# Patient Record
Sex: MALE | Race: WHITE | NOT HISPANIC OR LATINO | URBAN - METROPOLITAN AREA
[De-identification: names, ages, dates, MRNs, and addresses within clinical notes are randomized per-mention and may not be internally consistent; named-entity substitution may affect disease eponyms.]

---
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
## 2025-05-06 ENCOUNTER — NON-APPOINTMENT (OUTPATIENT)
Age: 20
End: 2025-05-06

## 2025-05-06 ENCOUNTER — INPATIENT (INPATIENT)
Facility: HOSPITAL | Age: 20
LOS: 2 days | Discharge: HOME | DRG: 201 | End: 2025-05-09
Attending: SURGERY | Admitting: SURGERY
Payer: COMMERCIAL

## 2025-05-06 VITALS
TEMPERATURE: 99 F | DIASTOLIC BLOOD PRESSURE: 82 MMHG | OXYGEN SATURATION: 99 % | WEIGHT: 121.03 LBS | HEART RATE: 99 BPM | RESPIRATION RATE: 19 BRPM | SYSTOLIC BLOOD PRESSURE: 133 MMHG

## 2025-05-06 DIAGNOSIS — J93.83 OTHER PNEUMOTHORAX: ICD-10-CM

## 2025-05-06 LAB
ALBUMIN SERPL ELPH-MCNC: 4.3 G/DL — SIGNIFICANT CHANGE UP (ref 3.3–5)
ALP SERPL-CCNC: 63 U/L — SIGNIFICANT CHANGE UP (ref 40–120)
ALT FLD-CCNC: 27 U/L — SIGNIFICANT CHANGE UP (ref 12–78)
ANION GAP SERPL CALC-SCNC: 4 MMOL/L — LOW (ref 5–17)
APTT BLD: 30.3 SEC — SIGNIFICANT CHANGE UP (ref 26.1–36.8)
AST SERPL-CCNC: 5 U/L — LOW (ref 15–37)
BASE EXCESS BLDV CALC-SCNC: 0.6 MMOL/L — SIGNIFICANT CHANGE UP (ref -2–3)
BASOPHILS # BLD AUTO: 0.06 K/UL — SIGNIFICANT CHANGE UP (ref 0–0.2)
BASOPHILS NFR BLD AUTO: 0.7 % — SIGNIFICANT CHANGE UP (ref 0–2)
BILIRUB SERPL-MCNC: 0.4 MG/DL — SIGNIFICANT CHANGE UP (ref 0.2–1.2)
BLD GP AB SCN SERPL QL: SIGNIFICANT CHANGE UP
BUN SERPL-MCNC: 15 MG/DL — SIGNIFICANT CHANGE UP (ref 7–23)
CALCIUM SERPL-MCNC: 8.8 MG/DL — SIGNIFICANT CHANGE UP (ref 8.5–10.1)
CHLORIDE SERPL-SCNC: 109 MMOL/L — HIGH (ref 96–108)
CO2 SERPL-SCNC: 27 MMOL/L — SIGNIFICANT CHANGE UP (ref 22–31)
CREAT SERPL-MCNC: 1.13 MG/DL — SIGNIFICANT CHANGE UP (ref 0.5–1.3)
EGFR: 95 ML/MIN/1.73M2 — SIGNIFICANT CHANGE UP
EGFR: 95 ML/MIN/1.73M2 — SIGNIFICANT CHANGE UP
EOSINOPHIL # BLD AUTO: 0.18 K/UL — SIGNIFICANT CHANGE UP (ref 0–0.5)
EOSINOPHIL NFR BLD AUTO: 2 % — SIGNIFICANT CHANGE UP (ref 0–6)
GAS PNL BLDV: SIGNIFICANT CHANGE UP
GLUCOSE SERPL-MCNC: 116 MG/DL — HIGH (ref 70–99)
HCO3 BLDV-SCNC: 27 MMOL/L — SIGNIFICANT CHANGE UP (ref 22–29)
HCT VFR BLD CALC: 48.6 % — SIGNIFICANT CHANGE UP (ref 39–50)
HGB BLD-MCNC: 16.4 G/DL — SIGNIFICANT CHANGE UP (ref 13–17)
IMM GRANULOCYTES # BLD AUTO: 0.03 K/UL — SIGNIFICANT CHANGE UP (ref 0–0.07)
IMM GRANULOCYTES NFR BLD AUTO: 0.3 % — SIGNIFICANT CHANGE UP (ref 0–0.9)
INR BLD: 1.14 RATIO — SIGNIFICANT CHANGE UP (ref 0.85–1.16)
LYMPHOCYTES # BLD AUTO: 2.61 K/UL — SIGNIFICANT CHANGE UP (ref 1–3.3)
LYMPHOCYTES NFR BLD AUTO: 28.7 % — SIGNIFICANT CHANGE UP (ref 13–44)
MCHC RBC-ENTMCNC: 28.6 PG — SIGNIFICANT CHANGE UP (ref 27–34)
MCHC RBC-ENTMCNC: 33.7 G/DL — SIGNIFICANT CHANGE UP (ref 32–36)
MCV RBC AUTO: 84.8 FL — SIGNIFICANT CHANGE UP (ref 80–100)
MONOCYTES # BLD AUTO: 0.77 K/UL — SIGNIFICANT CHANGE UP (ref 0–0.9)
MONOCYTES NFR BLD AUTO: 8.5 % — SIGNIFICANT CHANGE UP (ref 2–14)
NEUTROPHILS # BLD AUTO: 5.44 K/UL — SIGNIFICANT CHANGE UP (ref 1.8–7.4)
NEUTROPHILS NFR BLD AUTO: 59.8 % — SIGNIFICANT CHANGE UP (ref 43–77)
NRBC # BLD AUTO: 0 K/UL — SIGNIFICANT CHANGE UP (ref 0–0)
NRBC # FLD: 0 K/UL — SIGNIFICANT CHANGE UP (ref 0–0)
NRBC BLD AUTO-RTO: 0 /100 WBCS — SIGNIFICANT CHANGE UP (ref 0–0)
PCO2 BLDV: 52 MMHG — SIGNIFICANT CHANGE UP (ref 42–55)
PH BLDV: 7.33 — SIGNIFICANT CHANGE UP (ref 7.32–7.43)
PLATELET # BLD AUTO: 223 K/UL — SIGNIFICANT CHANGE UP (ref 150–400)
PMV BLD: 10 FL — SIGNIFICANT CHANGE UP (ref 7–13)
PO2 BLDV: 62 MMHG — HIGH (ref 25–45)
POTASSIUM SERPL-MCNC: 3.8 MMOL/L — SIGNIFICANT CHANGE UP (ref 3.5–5.3)
POTASSIUM SERPL-SCNC: 3.8 MMOL/L — SIGNIFICANT CHANGE UP (ref 3.5–5.3)
PROT SERPL-MCNC: 7.1 GM/DL — SIGNIFICANT CHANGE UP (ref 6–8.3)
PROTHROM AB SERPL-ACNC: 13.4 SEC — SIGNIFICANT CHANGE UP (ref 9.9–13.4)
RBC # BLD: 5.73 M/UL — SIGNIFICANT CHANGE UP (ref 4.2–5.8)
RBC # FLD: 12.2 % — SIGNIFICANT CHANGE UP (ref 10.3–14.5)
SAO2 % BLDV: 91 % — HIGH (ref 67–88)
SODIUM SERPL-SCNC: 140 MMOL/L — SIGNIFICANT CHANGE UP (ref 135–145)
TROPONIN I, HIGH SENSITIVITY RESULT: 3.93 NG/L — SIGNIFICANT CHANGE UP
WBC # BLD: 9.09 K/UL — SIGNIFICANT CHANGE UP (ref 3.8–10.5)
WBC # FLD AUTO: 9.09 K/UL — SIGNIFICANT CHANGE UP (ref 3.8–10.5)

## 2025-05-06 PROCEDURE — 80048 BASIC METABOLIC PNL TOTAL CA: CPT

## 2025-05-06 PROCEDURE — 71250 CT THORAX DX C-: CPT | Mod: 26

## 2025-05-06 PROCEDURE — 36415 COLL VENOUS BLD VENIPUNCTURE: CPT

## 2025-05-06 PROCEDURE — 99235 HOSP IP/OBS SAME DATE MOD 70: CPT

## 2025-05-06 PROCEDURE — 71250 CT THORAX DX C-: CPT | Mod: MC

## 2025-05-06 PROCEDURE — 85027 COMPLETE CBC AUTOMATED: CPT

## 2025-05-06 PROCEDURE — 71045 X-RAY EXAM CHEST 1 VIEW: CPT | Mod: 26

## 2025-05-06 PROCEDURE — 93010 ELECTROCARDIOGRAM REPORT: CPT

## 2025-05-06 PROCEDURE — 99291 CRITICAL CARE FIRST HOUR: CPT

## 2025-05-06 PROCEDURE — 71045 X-RAY EXAM CHEST 1 VIEW: CPT

## 2025-05-06 RX ORDER — FINASTERIDE 1 MG/1
1.2 TABLET, FILM COATED ORAL
Refills: 0 | DISCHARGE

## 2025-05-06 RX ORDER — ONDANSETRON HCL/PF 4 MG/2 ML
4 VIAL (ML) INJECTION EVERY 8 HOURS
Refills: 0 | Status: DISCONTINUED | OUTPATIENT
Start: 2025-05-06 | End: 2025-05-09

## 2025-05-06 RX ORDER — ACETAMINOPHEN 500 MG/5ML
1000 LIQUID (ML) ORAL ONCE
Refills: 0 | Status: COMPLETED | OUTPATIENT
Start: 2025-05-06 | End: 2025-05-06

## 2025-05-06 RX ORDER — OXYCODONE HYDROCHLORIDE 30 MG/1
5 TABLET ORAL EVERY 6 HOURS
Refills: 0 | Status: DISCONTINUED | OUTPATIENT
Start: 2025-05-06 | End: 2025-05-07

## 2025-05-06 RX ORDER — B1/B2/B3/B5/B6/B12/VIT C/FOLIC 500-0.5 MG
1 TABLET ORAL
Refills: 0 | DISCHARGE

## 2025-05-06 RX ORDER — KETOROLAC TROMETHAMINE 30 MG/ML
15 INJECTION, SOLUTION INTRAMUSCULAR; INTRAVENOUS EVERY 8 HOURS
Refills: 0 | Status: DISCONTINUED | OUTPATIENT
Start: 2025-05-06 | End: 2025-05-07

## 2025-05-06 RX ORDER — MAGNESIUM, ALUMINUM HYDROXIDE 200-200 MG
30 TABLET,CHEWABLE ORAL EVERY 4 HOURS
Refills: 0 | Status: DISCONTINUED | OUTPATIENT
Start: 2025-05-06 | End: 2025-05-09

## 2025-05-06 RX ORDER — OXYCODONE HYDROCHLORIDE 30 MG/1
2.5 TABLET ORAL EVERY 6 HOURS
Refills: 0 | Status: DISCONTINUED | OUTPATIENT
Start: 2025-05-06 | End: 2025-05-07

## 2025-05-06 RX ORDER — ACETAMINOPHEN 500 MG/5ML
975 LIQUID (ML) ORAL EVERY 6 HOURS
Refills: 0 | Status: COMPLETED | OUTPATIENT
Start: 2025-05-06 | End: 2026-04-04

## 2025-05-06 RX ORDER — BIOTIN 10 MG
0 TABLET ORAL
Refills: 0 | DISCHARGE

## 2025-05-06 RX ADMIN — OXYCODONE HYDROCHLORIDE 2.5 MILLIGRAM(S): 30 TABLET ORAL at 21:17

## 2025-05-06 RX ADMIN — Medication 400 MILLIGRAM(S): at 20:50

## 2025-05-06 RX ADMIN — KETOROLAC TROMETHAMINE 15 MILLIGRAM(S): 30 INJECTION, SOLUTION INTRAMUSCULAR; INTRAVENOUS at 20:50

## 2025-05-06 NOTE — ED PROVIDER NOTE - CLINICAL SUMMARY MEDICAL DECISION MAKING FREE TEXT BOX
plan: labs, place on 100% non rebreather, consult cardiothoracic/icu for admission and possible CT of chest and or chest tube.

## 2025-05-06 NOTE — H&P ADULT - NSHPPHYSICALEXAM_GEN_ALL_CORE
General: NAD  CV: Tachycardia   Pulm: absent breath sounds on Left   Abd: Soft.   Skin: Warm   Neuro: NFD. + anxiety

## 2025-05-06 NOTE — ED PROVIDER NOTE - CRITICAL CARE ATTENDING CONTRIBUTION TO CARE
direct patient care (not related to procedure), additional history taking, interpretation of diagnostic studies, documentation, consultation with other physicians, consult w/ pt's family directly relating to pts condition  B Jadyn ARSHAD

## 2025-05-06 NOTE — ED PROVIDER NOTE - PHYSICAL EXAMINATION
Gen:  Well appearing in NAD  Head:  NC/AT  HEENT: pupils perrl,no pharyngeal erythema, uvula midline  Cardiac: S1S2, RRR  Abd: Soft, non tender  Resp: No distress, decreased breath sounds left side.   musculoskeletal:: no deformities, no swelling, strength +5/+5  Skin: warm and dry as visualized, no rashes  Neuro: LANDY, aao x 4  Psych:alert, cooperative, appropriate mood and affect for situation

## 2025-05-06 NOTE — ED ADULT NURSE NOTE - OBJECTIVE STATEMENT
Pt ambulatory to the Ed sent from  for collapsed left lung on the cxr. Pt denies smoking, contact sports, falls and any trauma. Placed on cardiac monitor with , EKG done, placed on NRB @100% as per MD Salamanca, PIV obtained, labs sent.

## 2025-05-06 NOTE — PROCEDURE NOTE - ADDITIONAL PROCEDURE DETAILS
21 y/o M patient with moderate-large spontaneous pneumothorax. Pneumothorax and anatomy assessed with ultrasound prior to exam, lung sliding absent from apex to approx 9th rib space with transition point noted on the anterior chest approx 9th-10th rib space.     Patient was prepped, xylocaine given for local anesthesia. Needle inserted over the rib into the pleural space, air obtained in syringe. Guidewire placed through needle. Site was dilated sucessfully, pigtail chest tube inserted via seldinger technique. Connected to suction, air leak noted. Patient reported improvement post procedure.     Procedure performed independent of critical care time.     Dx:   Pneumothorax.     Date of entry is equal to date of service.

## 2025-05-06 NOTE — PHARMACOTHERAPY INTERVENTION NOTE - COMMENTS
Medication reconciliation completed.  Reviewed Medication list and confirmed med allergies with patient; confirmed with Dr. First Medjohan.

## 2025-05-06 NOTE — PATIENT PROFILE ADULT - FALL HARM RISK - HARM RISK INTERVENTIONS

## 2025-05-06 NOTE — H&P ADULT - ASSESSMENT
Assessment     1) Spontaneous Left PTX     Plan      Assessment     1) Spontaneous Left PTX     Plan     - Multi modal pain control  - Left pig tail placed with resolution of a majority of ptx, apical air remains  - Continue Assessment     1) Spontaneous Left PTX     Plan     - Multi modal pain control with Tylenol, NSAIDs, and Oxycodone   - Left pig tail placed with resolution of a majority of ptx, apical pneumothorax remains  - Tachycardia improved from 115 to 80 s/p pigtail placement  - No air leak. + tidaling   - CT chest with residual PTX  - Continue chest tube at -20mmhg  - CXR in AM  - May require re- positioning   - Case discussed with thoarcic surgeon  - Admit to tele with continuous pulse oximetry    Time spent on this patient encounter, which includes documenting this note in the electronic medical record, was 77 minutes including assessing the presenting problems with associated risks, reviewing the medical record to prepare for the encounter, and meeting face to face with patient to obtain additional history. I have also performed an appropriate physical exam, made interventions listed and ordered and interpreted appropriate diagnostic studies as documented. To improve communication and patient safety, I have coordinated care with the multidisciplinary team including the bedside nurse, appropriate attending of record and consultants as needed.

## 2025-05-06 NOTE — ED ADULT NURSE NOTE - NSFALLUNIVINTERV_ED_ALL_ED
Bed/Stretcher in lowest position, wheels locked, appropriate side rails in place/Call bell, personal items and telephone in reach/Instruct patient to call for assistance before getting out of bed/chair/stretcher/Non-slip footwear applied when patient is off stretcher/[place] to call system/Physically safe environment - no spills, clutter or unnecessary equipment/Purposeful proactive rounding/Room/bathroom lighting operational, light cord in reach

## 2025-05-06 NOTE — ED PROVIDER NOTE - OBJECTIVE STATEMENT
20 year old male presents to ED sent in for spontaneous pneumothorax. patient states this morning he had a sharp stabbing sensation to the left upper chest with some difficulty breathing. patient went to urgent care and had a chest x-ray done which showed a spontaneous pneumothorax.

## 2025-05-06 NOTE — ED PROVIDER NOTE - PROGRESS NOTE DETAILS
spoke with icu Kenn Ervin for consult pt received  pig tail cath left side and tba to telemetry ROBYN Salamanca DO

## 2025-05-06 NOTE — ED ADULT TRIAGE NOTE - CHIEF COMPLAINT QUOTE
Pt presents to [facility] complaining of sent by MD. Hines in  PTA for chest pain.  called pt and told him that his lung had collapsed. Pt breathing even and unlabored in triage SPO2 100 on RA. Stat EKG to be completed.

## 2025-05-06 NOTE — PATIENT PROFILE ADULT - FUNCTIONAL ASSESSMENT - BASIC MOBILITY 6.
Quality 226: Preventive Care And Screening: Tobacco Use: Screening And Cessation Intervention: Patient screened for tobacco use and is an ex/non-smoker Detail Level: Detailed Quality 130: Documentation Of Current Medications In The Medical Record: Current Medications Documented Quality 431: Preventive Care And Screening: Unhealthy Alcohol Use - Screening: Patient not identified as an unhealthy alcohol user when screened for unhealthy alcohol use using a systematic screening method 4 = No assist / stand by assistance

## 2025-05-06 NOTE — ED ADULT NURSE NOTE - CHIEF COMPLAINT QUOTE
Pt presents to [facility] complaining of sent by MD. Hines in  PTA for chest pain.  called pt and told him that his lung had collapsed. Pt breathing even and unlabored in triage SPO2 100 on RA. Stat EKG to be completed.

## 2025-05-06 NOTE — ED PROVIDER NOTE - CROS ED MARK PERT SYS NEG
RX PROGRESS NOTE: Vancomycin Therapeutic Drug Monitoring    Day of therapy: 5    Indication and target trough: Osteomyelitis (10-15 mcg/mL)    Current vancomycin dosing regimen: 750 mg IVPB every 12 hours    Most recent height and weight information:  Weight: 70.4 kg (12/18/24 1810)  Height: 6' 4\" (193 cm) (12/18/24 1810)    The Following are the Calculated  Current Weights for Ismael Lozano       Adjusted Ideal    70.4 kg 86.8 kg            Labs:  Serum Creatinine and Creatinine Clearance:  Serum creatinine: 0.7 mg/dL 12/22/24 0325  Estimated creatinine clearance: 104.8 mL/min    Vancomycin Serum Concentrations:  Vancomycin, Trough (mcg/mL)   Date/Time Value   12/22/2024 0634 19.1       Assessment:  Serum concentration of 19.1 mcg/mL after the 3rd dose (drawn 2 hours early, expected Vt ~ 14.8). Based on the serum concentration, will keep regimen at vancomycin 750 mg IVPB every 12 hours.    Additional serum concentrations may be necessary depending on pathogen identified, risk factors for adverse events, and/or duration of therapy.  Pharmacy will continue to follow and adjust as needed.    Thank you,    Kinjal Villalobos [facility]  12/22/2024 8:47 AM         nikki all pertinent systems negative

## 2025-05-06 NOTE — H&P ADULT - NS ATTEND AMEND GEN_ALL_CORE FT
I have personally seen and examined the patient.  I fully participated in the care of this patient.  I have made amendments to the documentation where necessary, and agree with the history, physical exam, and plan as documented above.    L spontaneous ptx, tube thoracostomy  keep to sxn 48hr  CT chest  daily cxr  pain control  incentive spirometry

## 2025-05-07 LAB
ANION GAP SERPL CALC-SCNC: 4 MMOL/L — LOW (ref 5–17)
BUN SERPL-MCNC: 15 MG/DL — SIGNIFICANT CHANGE UP (ref 7–23)
CALCIUM SERPL-MCNC: 9.3 MG/DL — SIGNIFICANT CHANGE UP (ref 8.5–10.1)
CHLORIDE SERPL-SCNC: 109 MMOL/L — HIGH (ref 96–108)
CO2 SERPL-SCNC: 28 MMOL/L — SIGNIFICANT CHANGE UP (ref 22–31)
CREAT SERPL-MCNC: 1.18 MG/DL — SIGNIFICANT CHANGE UP (ref 0.5–1.3)
EGFR: 91 ML/MIN/1.73M2 — SIGNIFICANT CHANGE UP
EGFR: 91 ML/MIN/1.73M2 — SIGNIFICANT CHANGE UP
GLUCOSE SERPL-MCNC: 108 MG/DL — HIGH (ref 70–99)
HCT VFR BLD CALC: 45.7 % — SIGNIFICANT CHANGE UP (ref 39–50)
HGB BLD-MCNC: 15.1 G/DL — SIGNIFICANT CHANGE UP (ref 13–17)
MCHC RBC-ENTMCNC: 28.4 PG — SIGNIFICANT CHANGE UP (ref 27–34)
MCHC RBC-ENTMCNC: 33 G/DL — SIGNIFICANT CHANGE UP (ref 32–36)
MCV RBC AUTO: 85.9 FL — SIGNIFICANT CHANGE UP (ref 80–100)
NRBC # BLD AUTO: 0 K/UL — SIGNIFICANT CHANGE UP (ref 0–0)
NRBC # FLD: 0 K/UL — SIGNIFICANT CHANGE UP (ref 0–0)
NRBC BLD AUTO-RTO: 0 /100 WBCS — SIGNIFICANT CHANGE UP (ref 0–0)
PLATELET # BLD AUTO: 214 K/UL — SIGNIFICANT CHANGE UP (ref 150–400)
PMV BLD: 10 FL — SIGNIFICANT CHANGE UP (ref 7–13)
POTASSIUM SERPL-MCNC: 4 MMOL/L — SIGNIFICANT CHANGE UP (ref 3.5–5.3)
POTASSIUM SERPL-SCNC: 4 MMOL/L — SIGNIFICANT CHANGE UP (ref 3.5–5.3)
RBC # BLD: 5.32 M/UL — SIGNIFICANT CHANGE UP (ref 4.2–5.8)
RBC # FLD: 12.2 % — SIGNIFICANT CHANGE UP (ref 10.3–14.5)
SODIUM SERPL-SCNC: 141 MMOL/L — SIGNIFICANT CHANGE UP (ref 135–145)
WBC # BLD: 10.05 K/UL — SIGNIFICANT CHANGE UP (ref 3.8–10.5)
WBC # FLD AUTO: 10.05 K/UL — SIGNIFICANT CHANGE UP (ref 3.8–10.5)

## 2025-05-07 PROCEDURE — 99232 SBSQ HOSP IP/OBS MODERATE 35: CPT

## 2025-05-07 PROCEDURE — 71045 X-RAY EXAM CHEST 1 VIEW: CPT | Mod: 26

## 2025-05-07 RX ORDER — ACETAMINOPHEN 500 MG/5ML
1000 LIQUID (ML) ORAL ONCE
Refills: 0 | Status: COMPLETED | OUTPATIENT
Start: 2025-05-07 | End: 2025-05-07

## 2025-05-07 RX ORDER — ACETAMINOPHEN 500 MG/5ML
975 LIQUID (ML) ORAL EVERY 6 HOURS
Refills: 0 | Status: DISCONTINUED | OUTPATIENT
Start: 2025-05-07 | End: 2025-05-09

## 2025-05-07 RX ORDER — KETOROLAC TROMETHAMINE 30 MG/ML
15 INJECTION, SOLUTION INTRAMUSCULAR; INTRAVENOUS ONCE
Refills: 0 | Status: DISCONTINUED | OUTPATIENT
Start: 2025-05-07 | End: 2025-05-07

## 2025-05-07 RX ORDER — TRAMADOL HYDROCHLORIDE 50 MG/1
50 TABLET, FILM COATED ORAL EVERY 4 HOURS
Refills: 0 | Status: DISCONTINUED | OUTPATIENT
Start: 2025-05-07 | End: 2025-05-09

## 2025-05-07 RX ORDER — LIDOCAINE HYDROCHLORIDE 20 MG/ML
1 JELLY TOPICAL EVERY 24 HOURS
Refills: 0 | Status: DISCONTINUED | OUTPATIENT
Start: 2025-05-07 | End: 2025-05-09

## 2025-05-07 RX ORDER — KETOROLAC TROMETHAMINE 30 MG/ML
15 INJECTION, SOLUTION INTRAMUSCULAR; INTRAVENOUS EVERY 8 HOURS
Refills: 0 | Status: DISCONTINUED | OUTPATIENT
Start: 2025-05-07 | End: 2025-05-08

## 2025-05-07 RX ORDER — TRAMADOL HYDROCHLORIDE 50 MG/1
25 TABLET, FILM COATED ORAL EVERY 4 HOURS
Refills: 0 | Status: DISCONTINUED | OUTPATIENT
Start: 2025-05-07 | End: 2025-05-09

## 2025-05-07 RX ORDER — ACETAMINOPHEN 500 MG/5ML
1000 LIQUID (ML) ORAL ONCE
Refills: 0 | Status: DISCONTINUED | OUTPATIENT
Start: 2025-05-07 | End: 2025-05-09

## 2025-05-07 RX ADMIN — Medication 975 MILLIGRAM(S): at 04:01

## 2025-05-07 RX ADMIN — LIDOCAINE HYDROCHLORIDE 1 PATCH: 20 JELLY TOPICAL at 10:14

## 2025-05-07 RX ADMIN — Medication 975 MILLIGRAM(S): at 22:16

## 2025-05-07 RX ADMIN — KETOROLAC TROMETHAMINE 15 MILLIGRAM(S): 30 INJECTION, SOLUTION INTRAMUSCULAR; INTRAVENOUS at 21:34

## 2025-05-07 RX ADMIN — KETOROLAC TROMETHAMINE 15 MILLIGRAM(S): 30 INJECTION, SOLUTION INTRAMUSCULAR; INTRAVENOUS at 15:17

## 2025-05-07 RX ADMIN — Medication 975 MILLIGRAM(S): at 20:02

## 2025-05-07 RX ADMIN — Medication 975 MILLIGRAM(S): at 03:01

## 2025-05-07 RX ADMIN — Medication 1000 MILLIGRAM(S): at 09:25

## 2025-05-07 RX ADMIN — TRAMADOL HYDROCHLORIDE 25 MILLIGRAM(S): 50 TABLET, FILM COATED ORAL at 22:10

## 2025-05-07 RX ADMIN — KETOROLAC TROMETHAMINE 15 MILLIGRAM(S): 30 INJECTION, SOLUTION INTRAMUSCULAR; INTRAVENOUS at 16:21

## 2025-05-07 RX ADMIN — KETOROLAC TROMETHAMINE 15 MILLIGRAM(S): 30 INJECTION, SOLUTION INTRAMUSCULAR; INTRAVENOUS at 10:08

## 2025-05-07 RX ADMIN — OXYCODONE HYDROCHLORIDE 5 MILLIGRAM(S): 30 TABLET ORAL at 04:14

## 2025-05-07 RX ADMIN — KETOROLAC TROMETHAMINE 15 MILLIGRAM(S): 30 INJECTION, SOLUTION INTRAMUSCULAR; INTRAVENOUS at 22:20

## 2025-05-07 RX ADMIN — Medication 400 MILLIGRAM(S): at 08:46

## 2025-05-07 RX ADMIN — LIDOCAINE HYDROCHLORIDE 1 PATCH: 20 JELLY TOPICAL at 22:17

## 2025-05-07 RX ADMIN — KETOROLAC TROMETHAMINE 15 MILLIGRAM(S): 30 INJECTION, SOLUTION INTRAMUSCULAR; INTRAVENOUS at 07:14

## 2025-05-07 RX ADMIN — LIDOCAINE HYDROCHLORIDE 1 PATCH: 20 JELLY TOPICAL at 20:51

## 2025-05-07 RX ADMIN — KETOROLAC TROMETHAMINE 15 MILLIGRAM(S): 30 INJECTION, SOLUTION INTRAMUSCULAR; INTRAVENOUS at 11:08

## 2025-05-07 RX ADMIN — KETOROLAC TROMETHAMINE 15 MILLIGRAM(S): 30 INJECTION, SOLUTION INTRAMUSCULAR; INTRAVENOUS at 06:59

## 2025-05-07 RX ADMIN — OXYCODONE HYDROCHLORIDE 5 MILLIGRAM(S): 30 TABLET ORAL at 03:14

## 2025-05-07 NOTE — PROGRESS NOTE ADULT - ASSESSMENT
20yr old male presenting to ED form UC with PTX on left side. Patient states this morning he developed some sharp pleuritic chest pain in shower which worsening with inspiration. Patient denies SOB, cough, fevers and all other ROS. Patient denies smoking, drug use, ETOH abuse, truama, fall, injury or change in activity. s/p Right Pigtail placement     plan   maintain Right Pigtail to -20 LWCS  IS  Pain mgmt   pt cannot ambulate off suction   CXR in am   DW DR Finley

## 2025-05-08 ENCOUNTER — TRANSCRIPTION ENCOUNTER (OUTPATIENT)
Age: 20
End: 2025-05-08

## 2025-05-08 PROCEDURE — 71045 X-RAY EXAM CHEST 1 VIEW: CPT | Mod: 26,77

## 2025-05-08 PROCEDURE — 71045 X-RAY EXAM CHEST 1 VIEW: CPT | Mod: 26

## 2025-05-08 PROCEDURE — 99232 SBSQ HOSP IP/OBS MODERATE 35: CPT

## 2025-05-08 RX ORDER — TRAMADOL HYDROCHLORIDE 50 MG/1
0.5 TABLET, FILM COATED ORAL
Qty: 10 | Refills: 0
Start: 2025-05-08

## 2025-05-08 RX ORDER — ACETAMINOPHEN 500 MG/5ML
3 LIQUID (ML) ORAL
Qty: 0 | Refills: 0 | DISCHARGE
Start: 2025-05-08

## 2025-05-08 RX ORDER — OXYCODONE HYDROCHLORIDE 30 MG/1
5 TABLET ORAL ONCE
Refills: 0 | Status: COMPLETED | OUTPATIENT
Start: 2025-05-08 | End: 2025-05-08

## 2025-05-08 RX ADMIN — LIDOCAINE HYDROCHLORIDE 1 PATCH: 20 JELLY TOPICAL at 09:14

## 2025-05-08 RX ADMIN — TRAMADOL HYDROCHLORIDE 25 MILLIGRAM(S): 50 TABLET, FILM COATED ORAL at 06:19

## 2025-05-08 RX ADMIN — KETOROLAC TROMETHAMINE 15 MILLIGRAM(S): 30 INJECTION, SOLUTION INTRAMUSCULAR; INTRAVENOUS at 06:19

## 2025-05-08 RX ADMIN — TRAMADOL HYDROCHLORIDE 25 MILLIGRAM(S): 50 TABLET, FILM COATED ORAL at 23:33

## 2025-05-08 RX ADMIN — Medication 975 MILLIGRAM(S): at 19:50

## 2025-05-08 RX ADMIN — TRAMADOL HYDROCHLORIDE 25 MILLIGRAM(S): 50 TABLET, FILM COATED ORAL at 18:37

## 2025-05-08 RX ADMIN — Medication 975 MILLIGRAM(S): at 20:20

## 2025-05-08 RX ADMIN — TRAMADOL HYDROCHLORIDE 25 MILLIGRAM(S): 50 TABLET, FILM COATED ORAL at 04:24

## 2025-05-08 RX ADMIN — LIDOCAINE HYDROCHLORIDE 1 PATCH: 20 JELLY TOPICAL at 23:24

## 2025-05-08 RX ADMIN — KETOROLAC TROMETHAMINE 15 MILLIGRAM(S): 30 INJECTION, SOLUTION INTRAMUSCULAR; INTRAVENOUS at 05:09

## 2025-05-08 NOTE — PROGRESS NOTE ADULT - ASSESSMENT
20yr old male presenting to ED form UC with PTX on left side. Patient states this morning he developed some sharp pleuritic chest pain in shower which worsening with inspiration. Patient denies SOB, cough, fevers and all other ROS. Patient denies smoking, drug use, ETOH abuse, truama, fall, injury or change in activity. s/p Right Pigtail placement     plan   * Not completed  20yr old male presenting to ED form UC with PTX on left side. Patient states this morning he developed some sharp pleuritic chest pain in shower which worsening with inspiration. Patient denies SOB, cough, fevers and all other ROS. Patient denies smoking, drug use, ETOH abuse, truama, fall, injury or change in activity. s/p Right Pigtail placement     plan   Pigtail to WS  CXR this afternoon, if no PTX, will remove Pigtail   Pt can potentially be discharged later today   Pain mgmt   DW DR Finley

## 2025-05-08 NOTE — DISCHARGE NOTE NURSING/CASE MANAGEMENT/SOCIAL WORK - PATIENT PORTAL LINK FT
You can access the FollowMyHealth Patient Portal offered by [facility] by registering at the following website: [email or web address]. By joining [facility]’s FollowMyHealth portal, you will also be able to view your health information using other applications (apps) compatible with our system.

## 2025-05-08 NOTE — DISCHARGE NOTE NURSING/CASE MANAGEMENT/SOCIAL WORK - NSDCFUADDAPPT_GEN_ALL_CORE_FT
Leave dressing intact until tomorrow evening, reinforcing with tape if necessary (about 36 hours from chest tube removal). At that time you may remove the dressing and take a shower. Place clean gauze over wound if continual drainage.  Call the office if you experience any fevers, shortness of breath, chest pain or excessive drainage from the incision, day or night. Go to the emergency room if any of these symptoms are severe. Take your medications as ordered and take a stool softener if needed with the narcotic medications.     No Ointments creams lotions to wounds     Please follow up with Dr Tushar stevenson    at [facility]   PCP Appt:Dr Hitchcock   5/28/2025@[address] [place] [address] [phone]

## 2025-05-08 NOTE — DISCHARGE NOTE NURSING/CASE MANAGEMENT/SOCIAL WORK - NSTRANSFERBELONGINGSDISPO_GEN_A_NUR
Previous Accession (Optional): [id number] Previous Accession (Optional): [id number] not applicable

## 2025-05-08 NOTE — DISCHARGE NOTE PROVIDER - PROVIDER TOKENS
FREE:[LAST:[Tushar],FIRST:[Ilia],PHONE:[phone]],FAX:[(   [phone]    -],ADDRESS:[[facility]]

## 2025-05-08 NOTE — DISCHARGE NOTE PROVIDER - NSDCMRMEDTOKEN_GEN_ALL_CORE_FT
acetaminophen 325 mg oral tablet: 3 tab(s) orally every 6 hours As needed Mild Pain (1 - 3)  Biotin: OTC supplement  Finasteride: 1.2 milligram(s) orally once a day ***[employer] brand Hair-Growth Formula Finasteride 1.2mg + Minoxidil 3mg***  minoxidil: 3 milligram(s) orally once a day ***[employer]IM[employer] brand Hair-Growth Formula Finasteride 1.2mg + Minoxidil 3mg***  Multiple Vitamins oral tablet: 1 tab(s) orally once a day  traMADol 50 mg oral tablet: 0.5 tab(s) orally every 6 hours as needed for Moderate Pain (4 - 6) MDD: 4

## 2025-05-08 NOTE — PROGRESS NOTE ADULT - SUBJECTIVE AND OBJECTIVE BOX
Subjective:  Pt in bed NAD no issues overnight. Pt states he feels much better this am     T(C): 37 (05-08-25 @ 05:08), Max: 37.2 (05-08-25 @ 00:09)  HR: 75 (05-08-25 @ 05:08) (64 - 75)  BP: 126/65 (05-08-25 @ 05:08) (113/60 - 129/67)  ABP: --  ABP(mean): --  RR: 18 (05-08-25 @ 05:08) (18 - 18)  SpO2: 99% (05-08-25 @ 05:08) (96% - 99%) RA   Wt(kg): --  CVP(mm Hg): --  CO: --  CI: --  PA: --                                              Tele:    CHEST TUBE:  5cc                              OUTPUT:     per 24 hours    AIR LEAKS:  [ ] YES [x ] NO          05-07    141  |  109[H]  |  15  ----------------------------<  108[H]  4.0   |  28  |  1.18    Ca    9.3      07 May 2025 06:21    TPro  7.1  /  Alb  4.3  /  TBili  0.4  /  DBili  x   /  AST  5[L]  /  ALT  27  /  AlkPhos  63  05-06                               15.1   10.05 )-----------( 214      ( 07 May 2025 06:21 )             45.7        PT/INR - ( 06 May 2025 18:54 )   PT: 13.4 sec;   INR: 1.14 ratio         PTT - ( 06 May 2025 18:54 )  PTT:30.3 sec         CAPILLARY BLOOD GLUCOSE               CXR: no PTX left side         Exam   Neuro:  Alert  Awake NAD   Pulm: CLear + rt pigtail   CV: RRR  Abd: soft   Extremities:  warm         Assessment:  20yMale    with PAST MEDICAL & SURGICAL HISTORY:  No pertinent past medical history      No significant past surgical history            Plan:  
  Subjective:  Pt in bed NAD Pigtail in place on suction . Pt states that he has some pain at pigtail insertion site     T(C): 36.7 (05-07-25 @ 08:01), Max: 37.2 (05-06-25 @ 18:40)  HR: 63 (05-07-25 @ 08:01) (63 - 108)  BP: 115/67 (05-07-25 @ 08:01) (100/67 - 142/80)  ABP: --  ABP(mean): --  RR: 18 (05-07-25 @ 08:01) (18 - 23)  SpO2: 98% (05-07-25 @ 08:01) (98% - 100%) RA   Wt(kg): --  CVP(mm Hg): --  CO: --  CI: --  PA: --                                              Tele: SR     CHEST TUBE:  0cc                             OUTPUT:     per 24 hours    AIR LEAKS:  [ ] YES [ x] NO          05-07    141  |  109[H]  |  15  ----------------------------<  108[H]  4.0   |  28  |  1.18    Ca    9.3      07 May 2025 06:21    TPro  7.1  /  Alb  4.3  /  TBili  0.4  /  DBili  x   /  AST  5[L]  /  ALT  27  /  AlkPhos  63  05-06                               15.1   10.05 )-----------( 214      ( 07 May 2025 06:21 )             45.7        PT/INR - ( 06 May 2025 18:54 )   PT: 13.4 sec;   INR: 1.14 ratio         PTT - ( 06 May 2025 18:54 )  PTT:30.3 sec         CAPILLARY BLOOD GLUCOSE               CXR: small apical PTX     < from: CT Chest No Cont (05.06.25 @ 21:15) >  IMPRESSION:  A small to moderate left apical pneumothorax is visible with left-sided   subsegmental atelectasis.    A left chest tube in place with the tip located in the left mid thoracic   cavity on image [id number], below the level of the left apical pneumothorax;   assess for repositioning if clinically indicated..    < end of copied text >          Exam   Neuro:  Alert Awake NAD   Pulm: decreased at bases b/l , clear + Right pigtail   CV: RRR  Abd: soft   Extremities:  warm         Assessment:  20yMale    with PAST MEDICAL & SURGICAL HISTORY:  No pertinent past medical history      No significant past surgical history            Plan:

## 2025-05-08 NOTE — PROVIDER CONTACT NOTE (OTHER) - ACTION/TREATMENT ORDERED:
oxycodone added for pain management. follow up with primary team attending in AM if pain continues to worsen.

## 2025-05-08 NOTE — DISCHARGE NOTE PROVIDER - NSDCFUADDAPPT_GEN_ALL_CORE_FT
Leave dressing intact until tomorrow evening, reinforcing with tape if necessary (about 36 hours from chest tube removal). At that time you may remove the dressing and take a shower. Place clean gauze over wound if continual drainage.  Call the office if you experience any fevers, shortness of breath, chest pain or excessive drainage from the incision, day or night. Go to the emergency room if any of these symptoms are severe. Take your medications as ordered and take a stool softener if needed with the narcotic medications.     No Ointments creams lotions to wounds     Please follow up with Dr Tushar stevenson    at [facility]  Leave dressing intact until tomorrow evening, reinforcing with tape if necessary . At that time you may remove the dressing and take a shower. Place clean gauze over wound if continual drainage.  Call the office if you experience any fevers, shortness of breath, chest pain or excessive drainage from the incision, day or night. Go to the emergency room if any of these symptoms are severe. Take your medications as ordered and take a stool softener if needed with the narcotic medications.     No Ointments creams lotions to wounds     Remove dressing tomorrow afternoon and take a shower.      Please follow up with Dr Finley on  5/30 at 1PM  at [facility]     You will need a chest xray prior to your appointment. Please arrive an hour before and go to registration. They will direct you where to go.

## 2025-05-08 NOTE — DISCHARGE NOTE NURSING/CASE MANAGEMENT/SOCIAL WORK - FINANCIAL ASSISTANCE
[facility] provides services at a reduced cost to those who are determined to be eligible through [facility]’s financial assistance program. Information regarding [facility]’s financial assistance program can be found by going to [email or web address] or by calling [phone].

## 2025-05-08 NOTE — DISCHARGE NOTE PROVIDER - HOSPITAL COURSE
20yr old male presenting to ED form UC with PTX on left side. Patient states this morning he developed some sharp pleuritic chest pain in shower which worsening with inspiration. Patient denies SOB, cough, fevers and all other ROS. Patient denies smoking, drug use, ETOH abuse, truama, fall, injury or change in activity. s/p Right Pigtail placement 20yr old male presenting to ED form UC with PTX on left side. Patient states this morning he developed some sharp pleuritic chest pain in shower which worsening with inspiration. Patient denies SOB, cough, fevers and all other ROS. Patient denies smoking, drug use, ETOH abuse, trauma, fall, injury or change in activity. s/p Right Pigtail placement

## 2025-05-08 NOTE — DISCHARGE NOTE PROVIDER - NSDCFUSCHEDAPPT_GEN_ALL_CORE_FT
Alice Hitchcock  [facility] Physician [address]  Scheduled Appointment: 05/28/2025

## 2025-05-08 NOTE — CHART NOTE - NSCHARTNOTEFT_GEN_A_CORE
Small Left Apical PTX noted on CXR on [facility].  Pigtail now Clamped. Plan for repeat CXR at 9 pm , if stable leave clamped overnight unless patient becomes symptomatic .  CXR in am ordered.  If CXR stable in am will remove tube and discharge home . Tele discontinued Small Left Apical PTX noted on CXR on [facility].  Pigtail now Clamped. Plan for repeat CXR at 9 pm , if stable leave clamped overnight unless patient becomes symptomatic .  CXR in am ordered.  If CXR stable in am will remove tube and discharge home .

## 2025-05-08 NOTE — DISCHARGE NOTE PROVIDER - NSDCPNSUBOBJ_GEN_ALL_CORE
Subjective: Pt in bed NAD no issues overnight pigtail removed without issue   T(C): 36.6 (05-09-25 @ 04:43), Max: 37.1 (05-08-25 @ 16:05)  HR: 70 (05-09-25 @ 04:43) (70 - 96)  BP: 129/67 (05-09-25 @ 04:43) (124/78 - 137/63)  ABP: --  ABP(mean): --  RR: 18 (05-09-25 @ 04:43) (18 - 18)  SpO2: 95% (05-09-25 @ 04:43) (95% - 98%)RA  Wt(kg): --  CVP(mm Hg): --  CO: --  CI: --  PA: --                                              Tele: SR                                    CAPILLARY BLOOD GLUCOSE               CXR: small left apical PTX         Exam   Neuro:  Alert Awake NAD   Pulm: decreased at bases b/l + bandage at pigtail insertion site   CV: RRR  Abd: soft   Extremities: warm          Assessment:  20yMale    with PAST MEDICAL & SURGICAL HISTORY:  No pertinent past medical history      No significant past surgical history            20yr old male presenting to ED form UC with PTX on left side. Patient states this morning he developed some sharp pleuritic chest pain in shower which worsening with inspiration. Patient denies SOB, cough, fevers and all other ROS. Patient denies smoking, drug use, ETOH abuse, truama, fall, injury or change in activity. s/p Right Pigtail placement     plan   Pigtail removed without issue   CXR post removal with small left apical PTX    Pain mgmt   plan to discharge today  pt to follow up with Dr Finley in 2-3 weeks   DW DR Finley

## 2025-05-09 ENCOUNTER — TRANSCRIPTION ENCOUNTER (OUTPATIENT)
Age: 20
End: 2025-05-09

## 2025-05-09 VITALS
TEMPERATURE: 98 F | SYSTOLIC BLOOD PRESSURE: 129 MMHG | DIASTOLIC BLOOD PRESSURE: 67 MMHG | OXYGEN SATURATION: 95 % | HEART RATE: 70 BPM | RESPIRATION RATE: 18 BRPM

## 2025-05-09 PROCEDURE — 71045 X-RAY EXAM CHEST 1 VIEW: CPT | Mod: 26

## 2025-05-09 PROCEDURE — 99238 HOSP IP/OBS DSCHRG MGMT 30/<: CPT

## 2025-05-09 RX ORDER — KETOROLAC TROMETHAMINE 30 MG/ML
30 INJECTION, SOLUTION INTRAMUSCULAR; INTRAVENOUS ONCE
Refills: 0 | Status: DISCONTINUED | OUTPATIENT
Start: 2025-05-09 | End: 2025-05-09

## 2025-05-09 RX ADMIN — KETOROLAC TROMETHAMINE 30 MILLIGRAM(S): 30 INJECTION, SOLUTION INTRAMUSCULAR; INTRAVENOUS at 08:15

## 2025-05-09 RX ADMIN — TRAMADOL HYDROCHLORIDE 25 MILLIGRAM(S): 50 TABLET, FILM COATED ORAL at 00:33

## 2025-05-19 DIAGNOSIS — J93.83 OTHER PNEUMOTHORAX: ICD-10-CM

## 2025-05-22 PROBLEM — Z00.00 ENCOUNTER FOR PREVENTIVE HEALTH EXAMINATION: Status: ACTIVE | Noted: 2025-05-22

## 2025-05-23 PROBLEM — J93.83 SPONTANEOUS PNEUMOTHORAX: Status: ACTIVE | Noted: 2025-05-22

## 2025-05-23 PROBLEM — J93.9 PNEUMOTHORAX, LEFT: Status: ACTIVE | Noted: 2025-05-23

## 2025-05-26 ENCOUNTER — NON-APPOINTMENT (OUTPATIENT)
Age: 20
End: 2025-05-26

## 2025-05-28 ENCOUNTER — APPOINTMENT (OUTPATIENT)
Dept: FAMILY MEDICINE | Facility: CLINIC | Age: 20
End: 2025-05-28
Payer: COMMERCIAL

## 2025-05-28 VITALS
BODY MASS INDEX: 17.13 KG/M2 | DIASTOLIC BLOOD PRESSURE: 70 MMHG | HEIGHT: 68 IN | TEMPERATURE: 98.1 F | OXYGEN SATURATION: 99 % | SYSTOLIC BLOOD PRESSURE: 118 MMHG | WEIGHT: 113 LBS | HEART RATE: 87 BPM

## 2025-05-28 DIAGNOSIS — Z86.59 PERSONAL HISTORY OF OTHER MENTAL AND BEHAVIORAL DISORDERS: ICD-10-CM

## 2025-05-28 DIAGNOSIS — R63.6 UNDERWEIGHT: ICD-10-CM

## 2025-05-28 DIAGNOSIS — Z00.00 ENCOUNTER FOR GENERAL ADULT MEDICAL EXAMINATION W/OUT ABNORMAL FINDINGS: ICD-10-CM

## 2025-05-28 PROCEDURE — 99385 PREV VISIT NEW AGE 18-39: CPT

## 2025-05-30 ENCOUNTER — OUTPATIENT (OUTPATIENT)
Dept: OUTPATIENT SERVICES | Facility: HOSPITAL | Age: 20
LOS: 1 days | End: 2025-05-30
Payer: COMMERCIAL

## 2025-05-30 ENCOUNTER — APPOINTMENT (OUTPATIENT)
Dept: THORACIC SURGERY | Facility: CLINIC | Age: 20
End: 2025-05-30
Payer: COMMERCIAL

## 2025-05-30 VITALS
SYSTOLIC BLOOD PRESSURE: 115 MMHG | HEART RATE: 74 BPM | HEIGHT: 68 IN | BODY MASS INDEX: 17.28 KG/M2 | OXYGEN SATURATION: 98 % | DIASTOLIC BLOOD PRESSURE: 77 MMHG | WEIGHT: 114 LBS

## 2025-05-30 DIAGNOSIS — J93.83 OTHER PNEUMOTHORAX: ICD-10-CM

## 2025-05-30 DIAGNOSIS — J93.9 PNEUMOTHORAX, UNSPECIFIED: ICD-10-CM

## 2025-05-30 PROCEDURE — 71046 X-RAY EXAM CHEST 2 VIEWS: CPT | Mod: 26

## 2025-05-30 PROCEDURE — 99213 OFFICE O/P EST LOW 20 MIN: CPT

## 2025-05-30 PROCEDURE — 71046 X-RAY EXAM CHEST 2 VIEWS: CPT

## 2025-05-31 DIAGNOSIS — J93.83 OTHER PNEUMOTHORAX: ICD-10-CM
